# Patient Record
Sex: FEMALE | URBAN - METROPOLITAN AREA
[De-identification: names, ages, dates, MRNs, and addresses within clinical notes are randomized per-mention and may not be internally consistent; named-entity substitution may affect disease eponyms.]

---

## 2022-08-11 ENCOUNTER — APPOINTMENT (RX ONLY)
Dept: URBAN - METROPOLITAN AREA CLINIC 321 | Facility: CLINIC | Age: 67
Setting detail: DERMATOLOGY
End: 2022-08-11

## 2022-08-11 DIAGNOSIS — L81.4 OTHER MELANIN HYPERPIGMENTATION: ICD-10-CM

## 2022-08-11 DIAGNOSIS — R21 RASH AND OTHER NONSPECIFIC SKIN ERUPTION: ICD-10-CM | Status: INADEQUATELY CONTROLLED

## 2022-08-11 PROCEDURE — ? FULL BODY SKIN EXAM - DECLINED

## 2022-08-11 PROCEDURE — ? PRESCRIPTION

## 2022-08-11 PROCEDURE — 99203 OFFICE O/P NEW LOW 30 MIN: CPT

## 2022-08-11 PROCEDURE — ? COUNSELING

## 2022-08-11 PROCEDURE — ? TREATMENT REGIMEN

## 2022-08-11 RX ORDER — CLOBETASOL PROPIONATE 0.5 MG/ML
SOLUTION TOPICAL
Qty: 50 | Refills: 0 | Status: ERX | COMMUNITY
Start: 2022-08-11

## 2022-08-11 RX ORDER — KETOCONAZOLE 20 MG/ML
SHAMPOO, SUSPENSION TOPICAL BIW
Qty: 120 | Refills: 3 | Status: ERX | COMMUNITY
Start: 2022-08-11

## 2022-08-11 RX ADMIN — CLOBETASOL PROPIONATE: 0.5 SOLUTION TOPICAL at 00:00

## 2022-08-11 RX ADMIN — KETOCONAZOLE: 20 SHAMPOO, SUSPENSION TOPICAL at 00:00

## 2022-08-11 ASSESSMENT — LOCATION ZONE DERM
LOCATION ZONE: FACE
LOCATION ZONE: SCALP

## 2022-08-11 ASSESSMENT — LOCATION DETAILED DESCRIPTION DERM
LOCATION DETAILED: RIGHT INFERIOR CENTRAL MALAR CHEEK
LOCATION DETAILED: RIGHT MEDIAL FRONTAL SCALP

## 2022-08-11 ASSESSMENT — LOCATION SIMPLE DESCRIPTION DERM
LOCATION SIMPLE: RIGHT CHEEK
LOCATION SIMPLE: RIGHT SCALP

## 2022-08-11 NOTE — PROCEDURE: TREATMENT REGIMEN
Plan: - Pt advised that it is difficult to give a definitive diagnosis at this time as she has been using OTC dandruff shampoos. \\n- Pt advised of possible differentials. \\n- When asked if patient switched her hair color, she was unsure but thinks her daughter switched this. Pt advised it can be a contact reaction from the hair dye or even a shampoo, conditioner or other hair product. \\n- Follow up in 2 weeks
Detail Level: Zone
Initiate Treatment: - Ketoconazole 2% shampoo scalp twice a week, leave on for 5 minutes before rinsing clean \\n- Clobetasol scalp solution BID x 2 weeks/month as needed.

## 2023-05-04 ENCOUNTER — OFFICE VISIT (OUTPATIENT)
Dept: URBAN - METROPOLITAN AREA CLINIC 32 | Facility: CLINIC | Age: 68
End: 2023-05-04
Payer: MEDICARE

## 2023-05-04 DIAGNOSIS — H02.832 DERMATOCHALASIS OF RIGHT LOWER EYELID: ICD-10-CM

## 2023-05-04 DIAGNOSIS — H02.834 DERMATOCHALASIS OF LEFT UPPER EYELID: ICD-10-CM

## 2023-05-04 DIAGNOSIS — H02.831 DERMATOCHALASIS OF RIGHT UPPER EYELID: Primary | ICD-10-CM

## 2023-05-04 DIAGNOSIS — H02.835 DERMATOCHALASIS OF LEFT LOWER EYELID: ICD-10-CM

## 2023-05-04 PROCEDURE — 99204 OFFICE O/P NEW MOD 45 MIN: CPT | Performed by: OPHTHALMOLOGY

## 2023-05-04 ASSESSMENT — INTRAOCULAR PRESSURE
OS: 15
OD: 12

## 2023-05-04 NOTE — IMPRESSION/PLAN
Impression: Dermatochalasis of left lower eyelid: H02.835. Left. Condition: self limited, minor problem. Symptoms: could improve with surgery. Vision: vision not affected. Plan:  Discussed diagnosis in detail with patient. Discussed treatment options with patient. Bilateral lower eyelid blepharoplasty is an option. Surgical risks and benefits were discussed, explained and understood by patient. Patient will consider surgery.

## 2023-05-04 NOTE — IMPRESSION/PLAN
Impression: Dermatochalasis of left upper eyelid: H02.834. Left. Condition: self limited, minor problem. Symptoms: could improve with surgery. Vision: vision not affected. Plan: Discussion, surgery orders, risk level and pre-op instructions listed in plan #1.

## 2023-05-04 NOTE — IMPRESSION/PLAN
Impression: Dermatochalasis of right upper eyelid: H02.831. Right. Condition: self limited, minor problem. Symptoms: could improve with surgery. Vision: vision not affected. Plan: Discussed diagnosis in detail with patient. Discussed treatment options with patient. No treatment is required at this time, dermatochalasis is mild/moderate and not yet affecting vision. Patient may opt for cosmetic surgery, patient will consider surgery. Surgical treatment is an option. Surgical risks and benefits were discussed, explained and understood by patient. I would recommend Cosmetic Bilateral Upper Eyelid Blepharoplasty. Rl2, not on any blood thinners.

## 2023-05-04 NOTE — IMPRESSION/PLAN
Impression: Dermatochalasis of right lower eyelid: H02.832. Right. Condition: self limited, minor problem. Symptoms: could improve with surgery. Vision: vision not affected. Plan: Discussed diagnosis in detail with patient. Discussed treatment options with patient. Bilateral lower eyelid blepharoplasty is an option. Surgical risks and benefits were discussed, explained and understood by patient. Patient will consider surgery.

## 2023-06-15 ENCOUNTER — POST-OPERATIVE VISIT (OUTPATIENT)
Dept: URBAN - METROPOLITAN AREA CLINIC 32 | Facility: CLINIC | Age: 68
End: 2023-06-15
Payer: COMMERCIAL

## 2023-06-15 DIAGNOSIS — Z48.89 ENCOUNTER FOR OTHER SPECIFIED SURGICAL AFTERCARE: Primary | ICD-10-CM

## 2023-06-15 NOTE — IMPRESSION/PLAN
Impression: S/P Cosmetic Upper and Lower Lid Blepharoplasty OU - 13 Days. Encounter for other specified surgical aftercare  Z48.89.  Plan: last remaining sutures removed
keep incisions moist QHS with monica or vaseline for the next 2 weeks or so
edema and ecchymosis will continue resolving 
use heat compress to lower eyelid bruising BID
external photo updated today

## 2023-06-29 ENCOUNTER — POST-OPERATIVE VISIT (OUTPATIENT)
Dept: URBAN - METROPOLITAN AREA CLINIC 32 | Facility: CLINIC | Age: 68
End: 2023-06-29
Payer: COMMERCIAL

## 2023-06-29 DIAGNOSIS — Z48.89 ENCOUNTER FOR OTHER SPECIFIED SURGICAL AFTERCARE: Primary | ICD-10-CM

## 2023-06-29 PROCEDURE — 99024 POSTOP FOLLOW-UP VISIT: CPT | Performed by: OPHTHALMOLOGY

## 2023-06-29 NOTE — IMPRESSION/PLAN
Impression: S/P Cosmetic Upper and Lower Lid Blepharoplasty OU - 27 Days. Encounter for other specified surgical aftercare  Z48.89.  Plan: start vitamin e oil and massage to both upper and lower incisions once daily for 60 seconds

## 2023-07-27 ENCOUNTER — POST-OPERATIVE VISIT (OUTPATIENT)
Dept: URBAN - METROPOLITAN AREA CLINIC 32 | Facility: CLINIC | Age: 68
End: 2023-07-27
Payer: COMMERCIAL

## 2023-07-27 DIAGNOSIS — Z48.89 ENCOUNTER FOR OTHER SPECIFIED SURGICAL AFTERCARE: Primary | ICD-10-CM

## 2023-07-27 PROCEDURE — 99024 POSTOP FOLLOW-UP VISIT: CPT | Performed by: OPHTHALMOLOGY

## 2023-07-27 ASSESSMENT — INTRAOCULAR PRESSURE
OD: 16
OS: 15